# Patient Record
Sex: MALE | Race: WHITE | Employment: UNEMPLOYED | URBAN - METROPOLITAN AREA
[De-identification: names, ages, dates, MRNs, and addresses within clinical notes are randomized per-mention and may not be internally consistent; named-entity substitution may affect disease eponyms.]

---

## 2017-02-01 ENCOUNTER — TELEPHONE (OUTPATIENT)
Dept: INTERNAL MEDICINE CLINIC | Facility: CLINIC | Age: 57
End: 2017-02-01

## 2017-02-02 ENCOUNTER — TELEPHONE (OUTPATIENT)
Dept: ENDOCRINOLOGY CLINIC | Facility: CLINIC | Age: 57
End: 2017-02-02

## 2017-03-10 ENCOUNTER — TELEPHONE (OUTPATIENT)
Dept: INTERNAL MEDICINE CLINIC | Facility: CLINIC | Age: 57
End: 2017-03-10

## 2017-03-10 NOTE — TELEPHONE ENCOUNTER
Spoke with pt. Pt stated does not want to use Dr. vAelina Vivar as PCP anymore, and does not want to be contacted again.

## 2017-03-13 RX ORDER — BLOOD SUGAR DIAGNOSTIC
STRIP MISCELLANEOUS
Qty: 100 STRIP | Refills: 0 | OUTPATIENT
Start: 2017-03-13

## 2018-04-15 ENCOUNTER — HOSPITAL ENCOUNTER (INPATIENT)
Facility: HOSPITAL | Age: 58
LOS: 3 days | Discharge: ANOTHER HOSPITAL | DRG: 287 | End: 2018-04-19
Attending: EMERGENCY MEDICINE | Admitting: HOSPITALIST
Payer: COMMERCIAL

## 2018-04-15 DIAGNOSIS — I10 ESSENTIAL HYPERTENSION: Primary | ICD-10-CM

## 2018-04-15 DIAGNOSIS — R77.8 ELEVATED TROPONIN: ICD-10-CM

## 2018-04-15 DIAGNOSIS — E11.8 TYPE 2 DIABETES MELLITUS WITH COMPLICATION, WITHOUT LONG-TERM CURRENT USE OF INSULIN (HCC): ICD-10-CM

## 2018-04-15 DIAGNOSIS — R13.19 ESOPHAGEAL DYSPHAGIA: ICD-10-CM

## 2018-04-15 DIAGNOSIS — I20.0 UNSTABLE ANGINA (HCC): ICD-10-CM

## 2018-04-16 ENCOUNTER — APPOINTMENT (OUTPATIENT)
Dept: CV DIAGNOSTICS | Facility: HOSPITAL | Age: 58
DRG: 287 | End: 2018-04-16
Attending: HOSPITALIST
Payer: COMMERCIAL

## 2018-04-16 ENCOUNTER — APPOINTMENT (OUTPATIENT)
Dept: INTERVENTIONAL RADIOLOGY/VASCULAR | Facility: HOSPITAL | Age: 58
DRG: 287 | End: 2018-04-16
Attending: INTERNAL MEDICINE
Payer: COMMERCIAL

## 2018-04-16 ENCOUNTER — APPOINTMENT (OUTPATIENT)
Dept: GENERAL RADIOLOGY | Facility: HOSPITAL | Age: 58
DRG: 287 | End: 2018-04-16
Attending: EMERGENCY MEDICINE
Payer: COMMERCIAL

## 2018-04-16 ENCOUNTER — ANESTHESIA EVENT (OUTPATIENT)
Dept: ENDOSCOPY | Facility: HOSPITAL | Age: 58
DRG: 287 | End: 2018-04-16
Payer: COMMERCIAL

## 2018-04-16 PROBLEM — I20.0 UNSTABLE ANGINA (HCC): Status: ACTIVE | Noted: 2018-04-16

## 2018-04-16 PROBLEM — R07.9 CHEST PAIN OF UNCERTAIN ETIOLOGY: Status: ACTIVE | Noted: 2018-04-16

## 2018-04-16 PROBLEM — E11.8 TYPE 2 DIABETES MELLITUS WITH COMPLICATION, WITHOUT LONG-TERM CURRENT USE OF INSULIN (HCC): Status: ACTIVE | Noted: 2018-04-16

## 2018-04-16 PROBLEM — I10 ESSENTIAL HYPERTENSION: Status: ACTIVE | Noted: 2018-04-16

## 2018-04-16 PROBLEM — R77.8 ELEVATED TROPONIN: Status: ACTIVE | Noted: 2018-04-16

## 2018-04-16 PROBLEM — R79.89 ELEVATED TROPONIN: Status: ACTIVE | Noted: 2018-04-16

## 2018-04-16 PROCEDURE — 71045 X-RAY EXAM CHEST 1 VIEW: CPT | Performed by: EMERGENCY MEDICINE

## 2018-04-16 PROCEDURE — 4A023N7 MEASUREMENT OF CARDIAC SAMPLING AND PRESSURE, LEFT HEART, PERCUTANEOUS APPROACH: ICD-10-PCS | Performed by: INTERNAL MEDICINE

## 2018-04-16 PROCEDURE — 93306 TTE W/DOPPLER COMPLETE: CPT | Performed by: HOSPITALIST

## 2018-04-16 PROCEDURE — 99223 1ST HOSP IP/OBS HIGH 75: CPT | Performed by: HOSPITALIST

## 2018-04-16 PROCEDURE — B2151ZZ FLUOROSCOPY OF LEFT HEART USING LOW OSMOLAR CONTRAST: ICD-10-PCS | Performed by: INTERNAL MEDICINE

## 2018-04-16 PROCEDURE — B2111ZZ FLUOROSCOPY OF MULTIPLE CORONARY ARTERIES USING LOW OSMOLAR CONTRAST: ICD-10-PCS | Performed by: INTERNAL MEDICINE

## 2018-04-16 RX ORDER — CLOPIDOGREL BISULFATE 75 MG/1
75 TABLET ORAL DAILY
Status: DISCONTINUED | OUTPATIENT
Start: 2018-04-17 | End: 2018-04-16

## 2018-04-16 RX ORDER — ATORVASTATIN CALCIUM 80 MG/1
80 TABLET, FILM COATED ORAL NIGHTLY
Status: DISCONTINUED | OUTPATIENT
Start: 2018-04-16 | End: 2018-04-16 | Stop reason: HOSPADM

## 2018-04-16 RX ORDER — ONDANSETRON 2 MG/ML
4 INJECTION INTRAMUSCULAR; INTRAVENOUS EVERY 4 HOURS PRN
Status: DISCONTINUED | OUTPATIENT
Start: 2018-04-16 | End: 2018-04-19

## 2018-04-16 RX ORDER — ENOXAPARIN SODIUM 100 MG/ML
1 INJECTION SUBCUTANEOUS ONCE
Status: COMPLETED | OUTPATIENT
Start: 2018-04-16 | End: 2018-04-16

## 2018-04-16 RX ORDER — HEPARIN SODIUM AND DEXTROSE 10000; 5 [USP'U]/100ML; G/100ML
INJECTION INTRAVENOUS CONTINUOUS
Status: ACTIVE | OUTPATIENT
Start: 2018-04-16 | End: 2018-04-17

## 2018-04-16 RX ORDER — LIDOCAINE HYDROCHLORIDE 10 MG/ML
INJECTION, SOLUTION INFILTRATION; PERINEURAL
Status: COMPLETED
Start: 2018-04-16 | End: 2018-04-16

## 2018-04-16 RX ORDER — VERAPAMIL HYDROCHLORIDE 2.5 MG/ML
INJECTION, SOLUTION INTRAVENOUS
Status: COMPLETED
Start: 2018-04-16 | End: 2018-04-16

## 2018-04-16 RX ORDER — SODIUM CHLORIDE 9 MG/ML
INJECTION, SOLUTION INTRAVENOUS CONTINUOUS
Status: DISCONTINUED | OUTPATIENT
Start: 2018-04-16 | End: 2018-04-16

## 2018-04-16 RX ORDER — MORPHINE SULFATE 4 MG/ML
4 INJECTION, SOLUTION INTRAMUSCULAR; INTRAVENOUS EVERY 2 HOUR PRN
Status: DISCONTINUED | OUTPATIENT
Start: 2018-04-16 | End: 2018-04-19

## 2018-04-16 RX ORDER — ASPIRIN 81 MG/1
324 TABLET, CHEWABLE ORAL ONCE
Status: COMPLETED | OUTPATIENT
Start: 2018-04-16 | End: 2018-04-16

## 2018-04-16 RX ORDER — TEMAZEPAM 7.5 MG/1
7.5 CAPSULE ORAL NIGHTLY PRN
Status: DISCONTINUED | OUTPATIENT
Start: 2018-04-16 | End: 2018-04-19

## 2018-04-16 RX ORDER — ASPIRIN 325 MG
325 TABLET ORAL DAILY
Status: DISCONTINUED | OUTPATIENT
Start: 2018-04-16 | End: 2018-04-19

## 2018-04-16 RX ORDER — METOPROLOL TARTRATE 5 MG/5ML
5 INJECTION INTRAVENOUS ONCE
Status: COMPLETED | OUTPATIENT
Start: 2018-04-16 | End: 2018-04-16

## 2018-04-16 RX ORDER — SODIUM CHLORIDE 9 MG/ML
INJECTION, SOLUTION INTRAVENOUS CONTINUOUS
Status: ACTIVE | OUTPATIENT
Start: 2018-04-16 | End: 2018-04-16

## 2018-04-16 RX ORDER — ASPIRIN 81 MG/1
324 TABLET, CHEWABLE ORAL ONCE
Status: DISCONTINUED | OUTPATIENT
Start: 2018-04-16 | End: 2018-04-16 | Stop reason: HOSPADM

## 2018-04-16 RX ORDER — MIDAZOLAM HYDROCHLORIDE 1 MG/ML
INJECTION INTRAMUSCULAR; INTRAVENOUS
Status: COMPLETED
Start: 2018-04-16 | End: 2018-04-16

## 2018-04-16 RX ORDER — DEXTROSE MONOHYDRATE 25 G/50ML
50 INJECTION, SOLUTION INTRAVENOUS
Status: DISCONTINUED | OUTPATIENT
Start: 2018-04-16 | End: 2018-04-19

## 2018-04-16 RX ORDER — METOPROLOL SUCCINATE 25 MG/1
25 TABLET, EXTENDED RELEASE ORAL
Status: DISCONTINUED | OUTPATIENT
Start: 2018-04-16 | End: 2018-04-16 | Stop reason: HOSPADM

## 2018-04-16 RX ORDER — ATORVASTATIN CALCIUM 20 MG/1
20 TABLET, FILM COATED ORAL NIGHTLY
Status: DISCONTINUED | OUTPATIENT
Start: 2018-04-16 | End: 2018-04-19

## 2018-04-16 RX ORDER — NITROGLYCERIN 0.4 MG/1
0.4 TABLET SUBLINGUAL EVERY 5 MIN PRN
Status: DISCONTINUED | OUTPATIENT
Start: 2018-04-16 | End: 2018-04-19

## 2018-04-16 RX ORDER — PANTOPRAZOLE SODIUM 40 MG/1
40 TABLET, DELAYED RELEASE ORAL
Status: DISCONTINUED | OUTPATIENT
Start: 2018-04-17 | End: 2018-04-17

## 2018-04-16 RX ORDER — MORPHINE SULFATE 4 MG/ML
1 INJECTION, SOLUTION INTRAMUSCULAR; INTRAVENOUS EVERY 2 HOUR PRN
Status: DISCONTINUED | OUTPATIENT
Start: 2018-04-16 | End: 2018-04-19

## 2018-04-16 RX ORDER — HEPARIN SODIUM AND DEXTROSE 10000; 5 [USP'U]/100ML; G/100ML
1000 INJECTION INTRAVENOUS ONCE
Status: COMPLETED | OUTPATIENT
Start: 2018-04-16 | End: 2018-04-16

## 2018-04-16 RX ORDER — MORPHINE SULFATE 4 MG/ML
2 INJECTION, SOLUTION INTRAMUSCULAR; INTRAVENOUS EVERY 2 HOUR PRN
Status: DISCONTINUED | OUTPATIENT
Start: 2018-04-16 | End: 2018-04-19

## 2018-04-16 RX ORDER — HEPARIN SODIUM 5000 [USP'U]/ML
INJECTION, SOLUTION INTRAVENOUS; SUBCUTANEOUS
Status: COMPLETED
Start: 2018-04-16 | End: 2018-04-16

## 2018-04-16 NOTE — PLAN OF CARE
NURSING ADMISSION NOTE      Patient admitted via Cart  Oriented to room. Safety precautions initiated. Bed in low position. Call light in reach.     Assumed care of patient at 0230--Admitted from ER wit CO CP with diaphoresis and R arm pain, along wi

## 2018-04-16 NOTE — PROGRESS NOTES
ALMA Beck back at bedside to speak w/patient re: needing groins prepped, consent signed, and severity of diagnosis. Per ALMA Osborn, pt now willing to have groins prepped.

## 2018-04-16 NOTE — SLP NOTE
Order received for bedside swallow evaluation however pt currently undergoing echocardiogram and NPO for pending angiogram per RN. Will re-attempt as available and appropriate.      Beth Pina MA, [id number] [facility]  Pager

## 2018-04-16 NOTE — PAYOR COMM NOTE
--------------  ADMISSION REVIEW     Payor: Asher CARLSON PPO  Subscriber #:  [id number]  Authorization Number: N/A    Admit date: 4/16/18  Admit time: 2000 [facility]       Admitting Physician: Ky Mccoy MD  Attending Physician:  Rodríguez Marquez MD  [facility] coronary disease with calcification. He eventually underwent cardiac catheterization which showed evidence of 3 vessel disease resulting in 2 cardiac stents. Patient's wife present later stating that he had a heart attack last year.   He never followed lymphadenopathy or carotid bruit. Cardiovascular exam: Regular rate and rhythm. There are no murmurs, rubs, gallops. Lungs: Clear to auscultation bilaterally. There is no audible wheezes, Rales, rhonchi. Abdomen: Soft, nontender, nondistended.   There 04/16 0103  Comment: Patient noted to have elevated troponin of 0.139. Patient was already given 4 baby aspirin, IV Lopressor. Patient was administered Lovenox 1 mg/kg subcutaneously. Patient will be admitted to cardiac telemetry floor.   Currently pain- diagnosis)  Unstable angina (HCC)  Type 2 diabetes mellitus with complication, without long-term current use of insulin (HCC)  Elevated troponin    Disposition:  Admit  4/16/2018 12:54 am    Follow-up:  No follow-up provider specified.       Medications Pre \"about 12lbs\" in the last month without trying.      Past Medical History:  Past Medical History:   Diagnosis Date   • Atherosclerosis of coronary artery    • DIABETES    • Diabetes ([facility] [address]    • Essential hypertension    • Type II or unspecified type diabet lesions. Psychiatric: Appropriate mood and affect.       Diagnostic Data:      Labs:  Recent Labs   Lab  04/16/18   0005   WBC  7.5   HGB  14.6   MCV  82.6   PLT  339.0   INR  0.90       Recent Labs   Lab  04/16/18   0005   GLU  319*   BUN  21*   CREATSER Injection Sai Vo MD      Metoprolol Succinate ER (Toprol XL) 24 hr tab 25 mg     Date Action Dose Route User    4/16/2018 0533 Given 25 mg Oral Gregorio Soto RN      metoprolol Tartrate (LOPRESSOR) 5 MG/5ML injection 5 mg     Date Action Dose R the food. There has not been a clear-cut exertional component, although he is fairly sedentary.   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     PAST MEDICAL HISTORY:  Coronary disease status post 2 stents by report 2017, hypertensio bruits. LUNGS:  Clear to percussion and auscultation. No rales, rhonchi, or wheezes. HEART:  Regular rate and rhythm. Normal S1, S2. No appreciable murmur, rub, or gallop. ABDOMEN:  Soft, nontender. Bowel sounds are present.   There is no liver or s [id number]  [facility]/

## 2018-04-16 NOTE — PROGRESS NOTES
Pt had LHC this am- found to have multi vessel CAD - Dr Wynn Areas speaking w/ pt and wife this afternoon  + trop this am      Also need w/u for dysphasia  Wt loss   Has lost 12 lbs in last month- c/o food getting caught     Speech to see, tolerated lunch

## 2018-04-16 NOTE — PROCEDURES
[facility]  Angio via Radial Artery Procedure Note    Escobar Levine Location: Cath Lab    CSN [id number] MRN [id number]   Admission Date 4/15/2018 Procedure Date 4/16/2018   Attending Physician Tanya Ríos MD Procedure Physician Andreina Montes MD significant disease  3. The circumflex artery is a codominant system with no significant disease  4. The left anterior descending artery is severely diseased throughout its course there is a 95% area of in-stent restenosis in the proximal LAD.   Proximal

## 2018-04-16 NOTE — CONSULTS
[facility]    PATIENT'S NAME: Leann Kim   ATTENDING PHYSICIAN: Baldemar Madera MD   CONSULTING PHYSICIAN: Yadira Mckeon M.D.    PATIENT ACCOUNT#:   [de-identified]    LOCATION:  E-A [address] [facility]  MEDICAL RECORD #:   [id number]       DATE OF B Coronary disease status post 2 stents by report 2017, hypertension, diabetes mellitus, dyslipidemia, dysphagia.     PAST SURGICAL HISTORY:  History of carpal tunnel surgery 2011, right shoulder surgery 2010, nasal septoplasty in the past.    MEDICATIONS: Bowel sounds are present. There is no liver or spleen appreciated. No palpable abdominal aorta. EXTREMITIES:  Without edema. Pulses are present bilaterally. LABORATORY DATA:  H and H of 14.6 and 41.4, white blood count 7.5, platelet count 472,100.

## 2018-04-16 NOTE — PROGRESS NOTES
04/16/18 0214 04/16/18 0220 04/16/18 0227   Vital Signs   Pulse 75 77 88   Heart Rate Source Monitor Monitor Monitor   Cardiac Rhythm NSR NSR NSR   Resp 19 18 18   Respiratory Quality Normal Normal Normal   /81 137/87 145/92   BP Location Left arm

## 2018-04-16 NOTE — ANESTHESIA PREPROCEDURE EVALUATION
PRE-OP EVALUATION    Patient Name: Royal Lyn    Pre-op Diagnosis: Esophageal dysphagia [R13.10]    Procedure(s):  ESOPHAGOGASTRODUODENOSCOPY (EGD)    Surgeon(s) and Role:     * Domi Diaz, DO - Primary    Pre-op vitals reviewed.   Temp: 97.8 ° UNIT/ML injection      [COMPLETED] iohexol (OMNIPAQUE) 350 MG/ML injection 100 mL 100 mL Injection ONCE PRN   [] 0.9%  NaCl infusion  Intravenous Continuous   atorvastatin (LIPITOR) tab 20 mg 20 mg Oral Nightly   [COMPLETED] heparin (PORCINE) 27456u there is an 80% stenosis. This is on an angulated segment  2. The left main coronary artery has no significant disease  3. The circumflex artery is a codominant system with no significant disease  4.   The left anterior descending artery is severely dise Lab Results  Component Value Date   INR 0.90 04/16/2018         Airway      Mallampati: III  Mouth opening: 3 FB  TM distance: 4 - 6 cm  Neck ROM: full Cardiovascular    Cardiovascular exam normal.         Dental    No notable dental history.

## 2018-04-16 NOTE — CONSULTS
Cardiology Consult Kenyatta Baires) Dictated #[id number]  April 16, 2018    IMPRESSION:  Unstable angina--  CAD S/P stenting at [facility] 2017--no records  HTN  DM  Dyslipidemia-hx marked hypertriglyceridemia  Medication noncompliance  Dysphagia with recent w

## 2018-04-16 NOTE — ED NOTES
Wife arrived and reports pt is non compliant with cardiac & diabetic meds. Also states he never followed up with cardiologist after heart attack 7/2017.

## 2018-04-16 NOTE — SLP NOTE
ADULT SWALLOWING EVALUATION    ASSESSMENT    ASSESSMENT/OVERALL IMPRESSION:  Order received for bedside swallow evaluation.  Pt is an 64 y/o male admitted with c/o chest pain and \"food sticking in my throat\" reporting a 12 lb weight loss in the past month uncertain etiology    Unstable angina (HCC)    Type 2 diabetes mellitus with complication, without long-term current use of insulin (HCC)    Elevated troponin      Past Medical History  Past Medical History:   Diagnosis Date   • Atherosclerosis of coronary

## 2018-04-16 NOTE — H&P
[facility]PITAL[facility]  History and Physical     Jose Omersulaiman Whittwilli Patient Status:  Inpatient    1960 MRN [id number]   [facility] 2NE-A Attending Vani Kay MD   Hosp Day # 0 PCP No primary care provider on file.      Chief Complai Allergies    Medications:    No current facility-administered medications on file prior to encounter.    Current Outpatient Prescriptions on File Prior to Encounter:  FREESTYLE TEST In Vitro Strip TEST DAILY Disp: 100 strip Rfl: 0   Fenofibrate 160 MG Oral reviewed in Epic. ASSESSMENT / PLAN:     1. NSTEMI/Unstable Angina - Atypical CP. Hx of CAD s/p PCI with medication noncompliance. 1. Cardiology  2. Tele, serial troponin   3. Lovenox, asa, metoprolol, atorva   2. DM  1. Hyperglycemia protocl  2.  A1

## 2018-04-16 NOTE — ED PROVIDER NOTES
Patient Seen in: [facility] Emergency Department    History   Patient presents with:  Chest Pain Angina (cardiovascular)    Stated Complaint: cp    HPI    81-year-old male with a history of type 2 diabetes, history coronary disease, presents to the mention of complication, uncontrolled 9/4/2015 2012       Past Surgical History:  02/11: OTHER SURGICAL HISTORY      Comment: carpal tunnel surgery  L/R  10/10: OTHER SURGICAL HISTORY      Comment: shoulder surgery  R   No date: OTHER SURGICAL HISTORY lesions noted.     ED Course     Labs Reviewed   TROPONIN I - Abnormal; Notable for the following:        Result Value    Troponin 0.139 (*)     All other components within normal limits   PROTHROMBIN TIME (PT) - Normal   PTT, ACTIVATED - Normal    Narrativ treating  [chest pain versus epigastric pain, hypertension]. The patient had good response to these medications. Patient continued to be observed here in the emergency department.   Patient remained stable throughout the emergency department observation

## 2018-04-16 NOTE — CONSULTS
[facility]                       Gastroenterology [address] [facility]    Jenny Campbell Patient Status:  Inpatient    1960 MRN [id number]   [facility] 2NE-A Attending MD Elise Mcpherson mg Sublingual Q5 Min PRN   glucose (DEX4) oral liquid 15 g 15 g Oral Q15 Min PRN   Or      Glucose-Vitamin C (DEX-4) 4-0.006 g chewable tab 4 tablet 4 tablet Oral Q15 Min PRN   Or      dextrose 50 % injection 50 mL 50 mL Intravenous Q15 Min PRN   Or      g other illicit substances. FamHx: The patient has no family history of colon cancer or other gastrointestinal malignancies;  ROS:  In addition to the pertinent positives described above:             Infectious Disease:  No chronic infections or recent fever cyanosis  Skin: Warm and dry  Psychiatric: Appropriate mood and congruent affect without obvious depression or anxiety  Labs:   Lab Results  Component Value Date   WBC 7.4 04/16/2018   HGB 13.7 04/16/2018   HCT 39.7 04/16/2018   .0 04/16/2018   CREA procedures now. I would also recommend colonoscopy given his weight loss and altered bowel habits, but he is declining at this time. He understands that there is a risk of missing colon cancer and polyps. 2. He is on CLD, NPO after MN  3.  PPI daily

## 2018-04-16 NOTE — CM/SW NOTE
04/16/18 1500   Patient Info   Patient's Mental Status Alert;Oriented   Patient's [address]   Patient lives with Spouse   Patient Status Prior to Admission   Independent with ADLs and Mobility Yes   Discharge Needs   Anticipated D/C needs O

## 2018-04-16 NOTE — ED INITIAL ASSESSMENT (HPI)
Pt to er via ems from home  Cp x 30 min = resolved   Was diaphoretic and had right arm pain   On arrival denied pain/sob

## 2018-04-16 NOTE — PROGRESS NOTES
1042: Rec'd report from [address] [place] [address]. 1055: Pt transferred to unit via bed; only IVF running. Right radial site verified with CCL transporting RNs. Telemetry monitor applied. VSS.   Right radial is clean, dry, intact (sheath already pulled in CCL an

## 2018-04-16 NOTE — PROGRESS NOTES
Still refusing to sign consent after APN spoke w/patient this AM regarding procedure & answered all questions. Refusing to have groins shaved/prepped stating \"I will leave and go to [facility] if they want to go through my groin.   I will only let t

## 2018-04-16 NOTE — CONSULTS
[facility]  Diabetes Clinical Nurse Specialist Consult Note    David Courser Shannan Patient Status:  Inpatient    1960 MRN [id number]   [facility] 2NE-A Attending Laila Méndez MD   Hosp Day # 0 PCP No primary care provider on file. steps.     Pt was still sleepy from EGD anesthesia, spoke at length to his wife. Pt is a  with local routes, he does not check his blood sugar regularly. Still no answer as to why patient stopped taking all his medications.  I explained I did no one)  · [address] (Kelin Mathis NP)    Valentino Able, MSN, APN, ACNS-BC, BC-ADM  Clinical Nurse Specialist  Diabetes Educator  4/16/2018  9:59 AM

## 2018-04-16 NOTE — PLAN OF CARE
Comments: Pt is A&OX4, VSS on RA, and maintaining NSR w/1st degree AVB on telemetry. Was very difficult this AM prior to procedure (refer to previously written notes by this writer).   Now, back from angiogram and verbalized to RN that \"I wasn't told anyt wrist\"  Interventions:   - See additional Care Plan goals for specific interventions    Outcome: Progressing    Problem: CARDIOVASCULAR - ADULT  Goal: Maintains optimal cardiac output and hemodynamic stability  INTERVENTIONS:  - Monitor vital signs, rhyth applesauce as needed  - Discontinue feeding and notify MD (or speech pathologist) if coughing or persistent throat clearing or wet/gurgly vocal quality is noted   Outcome: Progressing    Problem: PAIN - ADULT  Goal: Verbalizes/displays adequate comfort lev concerns in a socially appropriate manner  - Utilize positive, consistent limit setting strategies supporting safety of patient, staff and others  - Encourage participation in the decision making process about the behavioral management agreement  - Impleme

## 2018-04-17 ENCOUNTER — SURGERY (OUTPATIENT)
Age: 58
End: 2018-04-17

## 2018-04-17 ENCOUNTER — ANESTHESIA (OUTPATIENT)
Dept: ENDOSCOPY | Facility: HOSPITAL | Age: 58
DRG: 287 | End: 2018-04-17
Payer: COMMERCIAL

## 2018-04-17 PROCEDURE — 0DB38ZX EXCISION OF LOWER ESOPHAGUS, VIA NATURAL OR ARTIFICIAL OPENING ENDOSCOPIC, DIAGNOSTIC: ICD-10-PCS | Performed by: INTERNAL MEDICINE

## 2018-04-17 PROCEDURE — 0DB68ZX EXCISION OF STOMACH, VIA NATURAL OR ARTIFICIAL OPENING ENDOSCOPIC, DIAGNOSTIC: ICD-10-PCS | Performed by: INTERNAL MEDICINE

## 2018-04-17 PROCEDURE — 99232 SBSQ HOSP IP/OBS MODERATE 35: CPT | Performed by: HOSPITALIST

## 2018-04-17 PROCEDURE — 0DB18ZX EXCISION OF UPPER ESOPHAGUS, VIA NATURAL OR ARTIFICIAL OPENING ENDOSCOPIC, DIAGNOSTIC: ICD-10-PCS | Performed by: INTERNAL MEDICINE

## 2018-04-17 PROCEDURE — 0DB98ZX EXCISION OF DUODENUM, VIA NATURAL OR ARTIFICIAL OPENING ENDOSCOPIC, DIAGNOSTIC: ICD-10-PCS | Performed by: INTERNAL MEDICINE

## 2018-04-17 RX ORDER — HEPARIN SODIUM AND DEXTROSE 10000; 5 [USP'U]/100ML; G/100ML
INJECTION INTRAVENOUS CONTINUOUS
Status: DISCONTINUED | OUTPATIENT
Start: 2018-04-17 | End: 2018-04-17

## 2018-04-17 RX ORDER — PANTOPRAZOLE SODIUM 40 MG/1
40 TABLET, DELAYED RELEASE ORAL
Qty: 120 TABLET | Refills: 0 | Status: SHIPPED | OUTPATIENT
Start: 2018-04-17 | End: 2018-06-16

## 2018-04-17 RX ORDER — HEPARIN SODIUM AND DEXTROSE 10000; 5 [USP'U]/100ML; G/100ML
INJECTION INTRAVENOUS CONTINUOUS
Status: DISCONTINUED | OUTPATIENT
Start: 2018-04-17 | End: 2018-04-19

## 2018-04-17 RX ORDER — HEPARIN SODIUM 5000 [USP'U]/ML
30 INJECTION INTRAVENOUS; SUBCUTANEOUS ONCE
Status: COMPLETED | OUTPATIENT
Start: 2018-04-17 | End: 2018-04-17

## 2018-04-17 RX ORDER — PANTOPRAZOLE SODIUM 40 MG/1
40 TABLET, DELAYED RELEASE ORAL
Status: DISCONTINUED | OUTPATIENT
Start: 2018-04-17 | End: 2018-04-19

## 2018-04-17 RX ORDER — BLOOD SUGAR DIAGNOSTIC
STRIP MISCELLANEOUS
Qty: 100 STRIP | Refills: 1 | Status: SHIPPED | OUTPATIENT
Start: 2018-04-17

## 2018-04-17 RX ORDER — LANCETS
EACH MISCELLANEOUS
Qty: 102 EACH | Refills: 1 | Status: SHIPPED | OUTPATIENT
Start: 2018-04-17

## 2018-04-17 NOTE — PROGRESS NOTES
[facility]ITAL[facility]  Progress Note     Henri Campbell Patient Status:  Inpatient    1960 MRN [id number]   [facility] 2NE-A Attending Vero Ogden MD   Hosp Day # 1 PCP No primary care provider on file.      Chief Complaint:  Chest pa Recent Labs   Lab  04/16/18   0005  04/16/18   0532  04/16/18   0901   TROP  0.139*  1.630*  2.280*            Imaging: Imaging data reviewed in Epic.     Medications:   • pantoprazole (PROTONIX) IV push  40 mg Intravenous BID AC    Or   • Pantoprazol

## 2018-04-17 NOTE — PROGRESS NOTES
[facility]  Progress Note    Olive Campbell Patient Status:  Inpatient    1960 MRN [id number]   [facility] 2NE-A Attending Alem Anthony MD   Hosp Day # 1 PCP No primary care provider on file.      Subjective:  Patient sleepy af yordan Howe  4/17/2018  Patient seen and examined, long conversation with family concerning CABG vs PCI, I am leaning towards CABG, family is awaiitng second opinion from Dr Carlson Postin at [facility]

## 2018-04-17 NOTE — PAYOR COMM NOTE
--------------  CONTINUED STAY REVIEW    Payor: Carmel CARLSON PPO  Subscriber #:  [id number]  Authorization Number: [id number]    Admit date: 4/16/18  Admit [facility]: [facility]    Admitting Physician: Bing Mora MD  Attending Physician:  Bing Mora MD  [facility] Cardiology and possibly cardiac surgery to consult with patient today for next steps.      Pt was still sleepy from EGD anesthesia, spoke at length to his wife. Pt is a  with local routes, he does not check his blood sugar regularly.  Still no a insurance     PROVIDER F/U RECOMMENDATIONS:     · Patient's current PCP (does not currently have one)  · [address] (Padilla Shannon NP)     Nataly Gomes, MSN, APN, ACNS-BC, BC-ADM  Clinical Nurse Specialist  Diabetes Educator  4/1

## 2018-04-17 NOTE — PLAN OF CARE
BEHAVIOR    • Pt/Family maintain appropriate behavior and adhere to behavioral management agreement, if implemented Progressing        CARDIOVASCULAR - ADULT    • Maintains optimal cardiac output and hemodynamic stability Progressing    • Absence of cardia increasingly upset, wanting all doctors to be called now to see when they are coming in tomorrow, explained that right now only the on call docs will be answering and they will not know physician's specific schedule for tomorrow, explained RN can start raul

## 2018-04-17 NOTE — PLAN OF CARE
BEHAVIOR    • Pt/Family maintain appropriate behavior and adhere to behavioral management agreement, if implemented Progressing        CARDIOVASCULAR - ADULT    • Maintains optimal cardiac output and hemodynamic stability Progressing    • Absence of cardia

## 2018-04-17 NOTE — OPERATIVE REPORT
Operative Report-Esophagogastroduodenoscopy with cold biopsies  Meena Hackett 11/20/1960   [facility] [id number] MRN [id number]   Admission Date 4/15/2018 Operation Date 4/17/2018   Attending Physician Rach Ward MD Operating Physician Leslee Park DO - We will have patient follow up in clinic. Ok to continue blood thinners as patient needs this for his heart disease.      CC Report:     Isabelle Mooney  4/17/2018  8:33 AM

## 2018-04-17 NOTE — PROGRESS NOTES
[facility]  Diabetes Clinical Nurse Specialist Progress Note    Stan Campbell Patient Status:  Inpatient    1960 MRN [id number]   [facility] 2NE-A Attending Hanny Turner MD   Hosp Day # 1 PCP No primary care provider on file

## 2018-04-17 NOTE — ANESTHESIA POSTPROCEDURE EVALUATION
[facility] Patient Status:  Inpatient   Age/Gender 65year old male MRN [id number]   Location [facility] Attending Reddy Mosqueda MD   Hosp Day # 1 PCP No primary care provider on file.        Anesthesia Post-op Note

## 2018-04-18 PROCEDURE — 99233 SBSQ HOSP IP/OBS HIGH 50: CPT | Performed by: HOSPITALIST

## 2018-04-18 RX ORDER — GABAPENTIN 100 MG/1
100 CAPSULE ORAL 3 TIMES DAILY
Status: DISCONTINUED | OUTPATIENT
Start: 2018-04-18 | End: 2018-04-19

## 2018-04-18 NOTE — PROGRESS NOTES
[facility]PITAL[facility]  Progress Note     Jennifer Hackettwilliam Patient Status:  Inpatient    1960 MRN [id number]   [facility] 2NE-A Attending Reddy Mosqueda MD   Hosp Day # 2 PCP No primary care provider on file.      Chief Complaint:  Chest pa Estimated Creatinine Clearance: 87.6 mL/min (based on SCr of 0.88 mg/dL).     Recent Labs   Lab  04/16/18   0005   PTP  12.5   INR  0.90       Recent Labs   Lab  04/16/18   0005  04/16/18   0532  04/16/18   0901   TROP  0.139*  1.630*  2.280* edema    Cont cardiac meds, heparin gtt, CV surgery eval pending.     Adriane Em  4/18/2018

## 2018-04-18 NOTE — DIETARY NOTE
Nutrition Short Note    Pt screened for HgbA1C >8%. Appropriate education and handout with contact info provided. Reviewed all carb containing foods, serving sizes, meal balance, consistent intake, and snacks.   Practiced carb counting using every day examp

## 2018-04-18 NOTE — PAYOR COMM NOTE
--------------  CONTINUED STAY REVIEW    Payor: Josekwan Pereyra ROBYN [facility]  Subscriber #:  [id number]  Authorization Number: [id number]    Admit date: 4/16/18  Admit time: 5    Admitting Physician: Eli Nava MD  Attending Physician:  MD JUANA Morales Duodenal bulb ulcerations  PROCEDURE PERFORMED: EGD  INFORMED CONSENT: Once a brief history and physical examination was performed, the risks, benefits and alternatives to the procedure were discussed with the patient and/or family and informed consent was Temp 97.9 °F (36.6 °C) (Oral)   Resp 13   Ht 5' 7\" (1.702 m)   Wt 206 lb 11.2 oz (93.8 kg)   SpO2 93%   BMI 32.37 kg/m²      Temp (24hrs), Av.8 °F (36.6 °C), Min:97.6 °F (36.4 °C), Max:98 °F (36.7 °C)        Physical Exam:  Tele: NSR  General: Alert a

## 2018-04-18 NOTE — PLAN OF CARE
Patient/Family Goals    • Patient/Family Short Term Goal Adequate for Discharge          BEHAVIOR    • Pt/Family maintain appropriate behavior and adhere to behavioral management agreement, if implemented Progressing        CARDIOVASCULAR - ADULT    • Main

## 2018-04-18 NOTE — PROGRESS NOTES
[facility]  Cardiology Progress Note    Subjective:  No chest pain or shortness of breath. Resting comfortably in bed.       Objective:  /82 (BP Location: Left arm)   Pulse 90   Temp 97.7 °F (36.5 °C) (Oral)   Resp 20   Ht 170.2 cm (5' 7\")   Wt

## 2018-04-19 VITALS
TEMPERATURE: 98 F | RESPIRATION RATE: 18 BRPM | WEIGHT: 206.69 LBS | DIASTOLIC BLOOD PRESSURE: 93 MMHG | OXYGEN SATURATION: 95 % | HEIGHT: 67 IN | SYSTOLIC BLOOD PRESSURE: 142 MMHG | BODY MASS INDEX: 32.44 KG/M2 | HEART RATE: 85 BPM

## 2018-04-19 PROCEDURE — 99239 HOSP IP/OBS DSCHRG MGMT >30: CPT | Performed by: HOSPITALIST

## 2018-04-19 RX ORDER — GABAPENTIN 100 MG/1
100 CAPSULE ORAL 3 TIMES DAILY
Refills: 0 | Status: SHIPPED | COMMUNITY
Start: 2018-04-19 | End: 2020-12-11

## 2018-04-19 RX ORDER — ASPIRIN 325 MG
325 TABLET ORAL DAILY
Refills: 0 | Status: SHIPPED | COMMUNITY
Start: 2018-04-20 | End: 2020-12-11 | Stop reason: DRUGHIGH

## 2018-04-19 RX ORDER — ATORVASTATIN CALCIUM 20 MG/1
20 TABLET, FILM COATED ORAL NIGHTLY
Refills: 0 | Status: SHIPPED | COMMUNITY
Start: 2018-04-19 | End: 2020-12-11

## 2018-04-19 NOTE — PROGRESS NOTES
[facility]  Progress Note     [facility] Patient Status:  Inpatient    1960 MRN [id number]   [facility] 2NE-A Attending  Marie Mirza MD   Hosp Day # 3 PCP No primary care provider on file.      Chief Complaint:  Chest Pantoprazole Sodium  40 mg Oral BID AC   • aspirin  325 mg Oral Daily   • Insulin Aspart Pen  2-10 Units Subcutaneous TID CC and HS   • atorvastatin  20 mg Oral Nightly       ASSESSMENT / PLAN:     1. Unstable angina- multi vessel CAD -  1.  Cards following

## 2018-04-19 NOTE — CM/SW NOTE
04/19/18 1000   Discharge disposition   Expected discharge disposition Acute Care/Short Term H   Name of Guillermo Ramirez  Norma       received call from [facility], can accept patient today after 11:30am. [facility]

## 2018-04-19 NOTE — PROGRESS NOTES
Pt. Received discharge instructions and follow-up information. Questions addressed and answered. Plan for transfer to [facility] today at [address] [place] [address] via [facility]. RH IV to stay in with Heparin gtt infusing and telemetry worn during transfer.  Report given to

## 2018-04-19 NOTE — PAYOR COMM NOTE
--------------  DISCHARGE REVIEW    Payor: [employer] P[employer]  Subscriber #:  [id number]  Authorization Number: [id number]    Admit date: 4/16/18  Admit time:  0203  Discharge Date: 4/19/2018 11:35 AM     Admitting Physician: Love Mane MD  Attending Imaging data reviewed in Epic.     Medications:   • gabapentin  100 mg Oral TID   • pantoprazole (PROTONIX) IV push  40 mg Intravenous BID AC     Or   • Pantoprazole Sodium  40 mg Oral BID AC   • aspirin  325 mg Oral Daily   • Insulin Aspart Pen  2-10 Unit Progress Note     Subjective:  No chest pain or shortness of breath.   Resting comfortably in bed.       Objective:  /82 (BP Location: Left arm)   Pulse 90   Temp 97.7 °F (36.5 °C) (Oral)   Resp 20   Ht 170.2 cm (5' 7\")   Wt 206 lb 11.2 oz (93.8 kg)

## 2018-04-19 NOTE — PLAN OF CARE
Patient/Family Goals    • Patient/Family Short Term Goal Completed          BEHAVIOR    • Pt/Family maintain appropriate behavior and adhere to behavioral management agreement, if implemented Progressing        CARDIOVASCULAR - ADULT    • Maintains optimal

## 2018-04-19 NOTE — DISCHARGE SUMMARY
[facility]  DISCHARGE SUMMARY     [facility] Patient Status:  Inpatient    1960 MRN [id number]   [facility] 2NE-A Attending No att. providers found   Hosp Day # 3 PCP No primary care provider on file.      Date of Admiss taking any medication since that time, or following up with doctors. Cannot site a specific reason or concern why, except that he didn't want to miss work.      Also has several chronic symptoms on ROS.  +peripheral neuropathy, +knee and ankle pain, Gi upse needs to be on this for 8 weeks and then daily biopsies were negative for H. pylori infection or malignancy. Needs to follow-up in approximately 1 month with GI.     Patient has had noncompliance the benefits of following up to been stressed to him by all superficial small intestinal mucosa without diagnostic abnormality.  -No morphologic evidence of celiac disease.  -No evidence of architectural distortion, dysplasia or malignancy.     B.   Gastric antrum and body biopsies:  -Strips of antral and fundic muc these medications      Instructions Prescription details   ACCU-CHEK FASTCLIX LANCETS Misc      Each drum contains 6 lancets. Use one lancet for each blood glucose test, per physician instructions.    Quantity:  102 each  Refills:  1     aspirin 325 MG Tabs NP  [address] [facility] [place]  [phone]    Go to  5/8/2018 11AM Fidencio Roman [address] [place] [address], [address] [place] [address] [phone]    Go on 5/2/2018  for a follow-up with the GI Nurse practitioner at 1 p

## 2018-05-01 NOTE — PAYOR COMM NOTE
--------------  DISCHARGE REVIEW    [facility]  Subscriber #:  [id number]  Authorization Number: [id number]    Admit date: 4/16/18  Admit time:  0203  Discharge Date: 4/19/2018 11:35 AM    Please let us know if you need additional clinic Medium Risk  0-28   Low Risk. Risk of readmission: Koko Schultz has High Risk of readmission after discharge from the hospital.    Discharge Diagnosis:   1. Unstable angina multivessel CAD  2. Known CAD with status post stenting  3. Dysphasia  4.  U left ventricular EF 40–50% with a grade 1 diastolic dysfunction and positive wall motion abnormality. Patient is remained on heparin drip has had consult with CV surgery and films were also shared with Dr. Rupali Goodson at [facility].   Patient is decided to tr in the proximal LAD. Proximal to the stented proximal portion of the LAD there is also a new 90% stenosis there is a long area of 80% stenosis in the mid LAD and then a 95% stenosis in the apical LAD     Impression:  1.   Normal left ventricular function ventricular     relaxation - grade 1 diastolic dysfunction. 2. Regional wall motion abnormality: Hypokinesis of the basal-mid     anteroseptal and apical myocardium. 3. Left atrium: The left atrium was mildly enlarged. 4. Right ventricle:  The cavity siz changed:  See the new instructions. Use one test strip with each glucose test, per physician directions.    Quantity:  100 strip  Refills:  1        CONTINUE taking these medications      Instructions Prescription details   Fenofibrate 160 MG Tabs edema.   -----------------------------------------------------------------------------------------  PATIENT DISCHARGE INSTRUCTIONS: See electronic chart    Nelida Louise NP, APN  4/19/2018    Time spent:  > 30 minutes      Electronically signed by Blanca Hutton

## 2018-05-08 ENCOUNTER — TELEPHONE (OUTPATIENT)
Dept: INTERNAL MEDICINE CLINIC | Facility: CLINIC | Age: 58
End: 2018-05-08

## 2018-10-30 NOTE — LETTER
07/19/21        Una Beatty [facility] 108 [id number]      Dear Bronwyn [facility],    [facility] records indicate that you have outstanding lab work and or testing that was ordered for you and has not yet been completed:  Orders Placed This Encoun Behavioral health at patient's bedside

## 2020-02-28 ENCOUNTER — ANCILLARY ORDERS (OUTPATIENT)
Dept: INTERNAL MEDICINE CLINIC | Facility: CLINIC | Age: 60
End: 2020-02-28

## 2020-02-28 DIAGNOSIS — Z13.9 SCREENING PROCEDURE: ICD-10-CM

## 2020-04-24 ENCOUNTER — TELEPHONE (OUTPATIENT)
Dept: INTERNAL MEDICINE CLINIC | Facility: CLINIC | Age: 60
End: 2020-04-24

## 2020-04-24 NOTE — TELEPHONE ENCOUNTER
LOV with us 9/4/2015  Last fill: 1/31/2020 (#180) by provider: Chastity Ogden  No labs in our system since 4/2018    Please advise.

## 2020-04-24 NOTE — TELEPHONE ENCOUNTER
Wife called and stated that her  is unable to fill his prescription for Metformin 500mg from his cardiologist because the office is closed. Wife wants to know if Eitan Plummer can call in a prescription for him to [facility] mail order pharmacy.  Please

## 2020-10-21 ENCOUNTER — TELEPHONE (OUTPATIENT)
Dept: INTERNAL MEDICINE CLINIC | Facility: CLINIC | Age: 60
End: 2020-10-21

## 2020-10-21 NOTE — TELEPHONE ENCOUNTER
Pt spouse would like to ask pcp if he can continue to be a pt.  Pt was last seen in 2017 if so pt was exposed to a family member covid positive and would like to get tested

## 2020-10-21 NOTE — TELEPHONE ENCOUNTER
TO patient would like to re-establish as patient, are you okay with this or can he establish with someone else in office? Please advise.

## 2020-10-22 ENCOUNTER — HOSPITAL ENCOUNTER (EMERGENCY)
Age: 60
Discharge: HOME | End: 2020-10-22
Payer: COMMERCIAL

## 2020-10-22 VITALS
RESPIRATION RATE: 16 BRPM | DIASTOLIC BLOOD PRESSURE: 93 MMHG | SYSTOLIC BLOOD PRESSURE: 156 MMHG | BODY MASS INDEX: 32 KG/M2 | WEIGHT: 204 LBS | OXYGEN SATURATION: 97 % | HEART RATE: 96 BPM | TEMPERATURE: 96 F

## 2020-10-22 DIAGNOSIS — Z20.822 CLOSE EXPOSURE TO COVID-19 VIRUS: ICD-10-CM

## 2020-10-22 DIAGNOSIS — B34.9 VIRAL SYNDROME: Primary | ICD-10-CM

## 2020-10-22 PROCEDURE — 99283 EMERGENCY DEPT VISIT LOW MDM: CPT | Performed by: NURSE PRACTITIONER

## 2020-10-22 NOTE — ED PROVIDER NOTES
Patient Seen in: THE [facility] [place]      History   Patient presents with:  Covid    Stated Complaint: flu like s/s , requests covid testing    HPI  52-year-old male with history of diabetes and hypertension presents with a runny no °C) (Temporal)   Resp 16   Wt 92.5 kg   SpO2 97%   BMI 31.95 kg/m²         Physical Exam  Vitals signs and nursing note reviewed. Constitutional:       General: He is not in acute distress. Appearance: He is well-developed.  He is not ill-appearing or

## 2020-10-24 NOTE — ED NOTES
Pt was notified of +covid test results. Pt voicing frustration that he asked for a flu test and they did not do that, but I did explain to the patient that a +covid test would help explain his s/s.  Pt reports back pain, rn did encourage the patient to take

## 2020-12-11 ENCOUNTER — OFFICE VISIT (OUTPATIENT)
Dept: INTERNAL MEDICINE CLINIC | Facility: CLINIC | Age: 60
End: 2020-12-11
Payer: COMMERCIAL

## 2020-12-11 ENCOUNTER — LAB ENCOUNTER (OUTPATIENT)
Dept: LAB | Age: 60
End: 2020-12-11
Attending: NURSE PRACTITIONER
Payer: COMMERCIAL

## 2020-12-11 VITALS
TEMPERATURE: 99 F | RESPIRATION RATE: 16 BRPM | HEART RATE: 84 BPM | WEIGHT: 203.5 LBS | DIASTOLIC BLOOD PRESSURE: 74 MMHG | HEIGHT: 67 IN | BODY MASS INDEX: 31.94 KG/M2 | SYSTOLIC BLOOD PRESSURE: 126 MMHG

## 2020-12-11 DIAGNOSIS — E11.8 TYPE 2 DIABETES MELLITUS WITH COMPLICATION, WITHOUT LONG-TERM CURRENT USE OF INSULIN (HCC): ICD-10-CM

## 2020-12-11 DIAGNOSIS — Z00.00 LABORATORY EXAMINATION ORDERED AS PART OF A ROUTINE GENERAL MEDICAL EXAMINATION: ICD-10-CM

## 2020-12-11 DIAGNOSIS — I25.2 HISTORY OF MI (MYOCARDIAL INFARCTION): ICD-10-CM

## 2020-12-11 DIAGNOSIS — Z12.5 PROSTATE CANCER SCREENING: ICD-10-CM

## 2020-12-11 DIAGNOSIS — Z28.21 INFLUENZA VACCINE REFUSED: ICD-10-CM

## 2020-12-11 DIAGNOSIS — Z00.00 ROUTINE GENERAL MEDICAL EXAMINATION AT A HEALTH CARE FACILITY: Primary | ICD-10-CM

## 2020-12-11 DIAGNOSIS — Z12.11 COLON CANCER SCREENING: ICD-10-CM

## 2020-12-11 PROBLEM — R07.9 CHEST PAIN OF UNCERTAIN ETIOLOGY: Status: RESOLVED | Noted: 2018-04-16 | Resolved: 2020-12-11

## 2020-12-11 PROBLEM — I25.10 CORONARY ARTERY DISEASE INVOLVING NATIVE CORONARY ARTERY OF NATIVE HEART WITHOUT ANGINA PECTORIS: Status: ACTIVE | Noted: 2020-12-11

## 2020-12-11 PROCEDURE — 3008F BODY MASS INDEX DOCD: CPT | Performed by: NURSE PRACTITIONER

## 2020-12-11 PROCEDURE — 80053 COMPREHEN METABOLIC PANEL: CPT

## 2020-12-11 PROCEDURE — 83721 ASSAY OF BLOOD LIPOPROTEIN: CPT

## 2020-12-11 PROCEDURE — 80061 LIPID PANEL: CPT

## 2020-12-11 PROCEDURE — 85025 COMPLETE CBC W/AUTO DIFF WBC: CPT

## 2020-12-11 PROCEDURE — 82306 VITAMIN D 25 HYDROXY: CPT

## 2020-12-11 PROCEDURE — 99214 OFFICE O/P EST MOD 30 MIN: CPT | Performed by: NURSE PRACTITIONER

## 2020-12-11 PROCEDURE — 84443 ASSAY THYROID STIM HORMONE: CPT

## 2020-12-11 PROCEDURE — 36415 COLL VENOUS BLD VENIPUNCTURE: CPT

## 2020-12-11 PROCEDURE — 99396 PREV VISIT EST AGE 40-64: CPT | Performed by: NURSE PRACTITIONER

## 2020-12-11 PROCEDURE — 3078F DIAST BP <80 MM HG: CPT | Performed by: NURSE PRACTITIONER

## 2020-12-11 PROCEDURE — 83036 HEMOGLOBIN GLYCOSYLATED A1C: CPT

## 2020-12-11 PROCEDURE — 3074F SYST BP LT 130 MM HG: CPT | Performed by: NURSE PRACTITIONER

## 2020-12-11 RX ORDER — ASPIRIN 81 MG/1
81 TABLET, CHEWABLE ORAL DAILY
COMMUNITY
Start: 2019-04-18

## 2020-12-11 RX ORDER — ATORVASTATIN CALCIUM 20 MG/1
20 TABLET, FILM COATED ORAL NIGHTLY
Qty: 90 TABLET | Refills: 0 | Status: SHIPPED | OUTPATIENT
Start: 2020-12-11

## 2020-12-11 RX ORDER — METOPROLOL SUCCINATE 25 MG/1
25 TABLET, EXTENDED RELEASE ORAL DAILY
Qty: 90 TABLET | Refills: 0 | Status: SHIPPED | OUTPATIENT
Start: 2020-12-11

## 2020-12-11 RX ORDER — GABAPENTIN 100 MG/1
100 CAPSULE ORAL 3 TIMES DAILY
COMMUNITY
Start: 2019-04-18

## 2020-12-11 RX ORDER — ATORVASTATIN CALCIUM 40 MG/1
20 TABLET, FILM COATED ORAL DAILY
COMMUNITY
Start: 2019-04-18

## 2020-12-11 NOTE — PATIENT INSTRUCTIONS
Diabetes: Overview  Diabetes is a long-term health problem. It means your body doesn't make enough insulin. Or it may mean that your body can't use the insulin it makes. Insulin is a hormone in your body.  It lets blood sugar (glucose) reach the cells in · Try to reach your ideal weight. You may be able to cut back on or not have to take diabetes medicine if you eat the right foods and get exercise. · Don't smoke. Smoking worsens the effects of diabetes on your circulation.  You are much more likely to hav · Check your ketones often. Watch them more often if you are vomiting and having diarrhea. · Don't skip meals. Try to eat small meals on a regular schedule. Do this even if you don't feel like eating.   · Drink water or other liquids that don't have caffei · Thirst  · Headache  · Nausea or vomiting  · Belly (abdominal) pain  · Eyesight changes  · Fast breathing  Also call your provider right away if you have any of these signs of low blood sugar and they don't go away with the above treatment suggestions: · Kidney disease, leading to kidney failure. You may need dialysis or a kidney transplant.   · Nerve problems, causing pain or loss of feeling in your feet and other parts of your body. This may lead to loss of a limb.   · High blood pressure.  This health Many people with heart disease take the 4 medicines shown in this chart. Other common medicines are listed later. Your doctor or cardiac rehab team can help you look at the types of medicines that have been prescribed for you.   Type of medicine What it gonzalez

## 2020-12-11 NOTE — PROGRESS NOTES
Genna Anderson is a 62year old male who presents for a complete physical exam.   HPI:   Pt complains of none, has not been seen by a provider in several years. Is required to get a Cpx by insurance.  Pt has stopped all his medications other than metform Component Value Date    ALT 39 04/16/2018    ALT 51 09/04/2015    ALT 53 03/15/2012     No results found for: PSA     Current Outpatient Medications   Medication Sig Dispense Refill   • metFORMIN HCl 500 MG Oral Tab Take 1 tablet by mouth 2 (two) times a well otherwise  SKIN: denies any unusual skin lesions  EYES:denies blurred vision or double vision, + left eye floaters  HEENT: denies nasal congestion, sinus pain or ST  LUNGS: denies shortness of breath with exertion  CARDIOVASCULAR: denies chest pain on PANEL (14); Future  - HEMOGLOBIN A1C; Future  - LIPID PANEL; Future  - PSA SCREEN; Future  - ASSAY, THYROID STIM HORMONE; Future  - VITAMIN D, 25-HYDROXY; Future    3. Influenza vaccine refused    4.  History of MI (myocardial infarction)  Educated pt on ho

## 2020-12-15 ENCOUNTER — TELEPHONE (OUTPATIENT)
Dept: INTERNAL MEDICINE CLINIC | Facility: CLINIC | Age: 60
End: 2020-12-15

## 2020-12-17 ENCOUNTER — TELEPHONE (OUTPATIENT)
Dept: INTERNAL MEDICINE CLINIC | Facility: CLINIC | Age: 60
End: 2020-12-17

## 2020-12-17 DIAGNOSIS — E55.9 VITAMIN D DEFICIENCY: Primary | ICD-10-CM

## 2020-12-17 DIAGNOSIS — E11.8 TYPE 2 DIABETES MELLITUS WITH COMPLICATION, WITHOUT LONG-TERM CURRENT USE OF INSULIN (HCC): ICD-10-CM

## 2020-12-17 DIAGNOSIS — E78.2 ELEVATED CHOLESTEROL WITH ELEVATED TRIGLYCERIDES: ICD-10-CM

## 2020-12-17 RX ORDER — EMPAGLIFLOZIN 10 MG/1
1 TABLET, FILM COATED ORAL DAILY
Qty: 90 TABLET | Refills: 0 | Status: SHIPPED | OUTPATIENT
Start: 2020-12-17 | End: 2021-03-17

## 2020-12-17 RX ORDER — ERGOCALCIFEROL 1.25 MG/1
50000 CAPSULE ORAL WEEKLY
Qty: 12 CAPSULE | Refills: 1 | Status: SHIPPED | OUTPATIENT
Start: 2020-12-17 | End: 2021-03-17

## 2020-12-17 RX ORDER — FENOFIBRATE 145 MG/1
145 TABLET, COATED ORAL DAILY
Qty: 90 TABLET | Refills: 0 | Status: SHIPPED | OUTPATIENT
Start: 2020-12-17

## 2020-12-17 NOTE — PROGRESS NOTES
LM on VM for patient notifying continue taking Metformin along with Jardiance. Patient to call back with any further questions.

## 2020-12-17 NOTE — PROGRESS NOTES
Spoke with patient regarding test results, patient asking if should continue to take Metformin and Jardiance. Also, please review all orders and prescriptions, and alert. Thank you.

## 2020-12-19 NOTE — TELEPHONE ENCOUNTER
Wellness form completed by Dr. Dona Humphries. Faxed form to #[phone]. Patient's spouse (Chio) aware. Will  form at . Chio states Peggy Lopez should have filled out Vladimir's form and Dr. Dona Humphries should have filled out her form.

## 2020-12-22 NOTE — TELEPHONE ENCOUNTER
Metformin 500 mg Per pt is taking 100 mg BID (2 tablets in the morning and 2 at night) day of OV. LOV 12-11-20  Labs 12-11-20: HgBA1c    Please advise, Medication pended.

## 2020-12-29 NOTE — TELEPHONE ENCOUNTER
Pt spouse called to find out when pt's CPX form got faxed out and also requested to have his copy mailed to her, form in out going mail.

## 2021-03-20 DIAGNOSIS — Z23 NEED FOR VACCINATION: ICD-10-CM

## 2021-06-18 ENCOUNTER — TELEPHONE (OUTPATIENT)
Dept: INTERNAL MEDICINE CLINIC | Facility: CLINIC | Age: 61
End: 2021-06-18

## 2022-02-11 ENCOUNTER — TELEPHONE (OUTPATIENT)
Dept: INTERNAL MEDICINE CLINIC | Facility: CLINIC | Age: 62
End: 2022-02-11

## 2022-02-11 NOTE — TELEPHONE ENCOUNTER
Appointment changed to Dr. Umang Miller   Date Time Provider hospitals   2/17/2022  3:30 PM Gerard Huffman MD [facility] [place]

## 2022-02-11 NOTE — TELEPHONE ENCOUNTER
Patient was previous patient of TO, saw DUNG in 2020 for physical, he is scheduled with RP, but would like to be seen by TO. Please advise if okay to establish as new patient.

## 2022-02-17 ENCOUNTER — OFFICE VISIT (OUTPATIENT)
Dept: INTERNAL MEDICINE CLINIC | Facility: CLINIC | Age: 62
End: 2022-02-17
Payer: COMMERCIAL

## 2022-02-17 VITALS
RESPIRATION RATE: 20 BRPM | BODY MASS INDEX: 32.65 KG/M2 | DIASTOLIC BLOOD PRESSURE: 80 MMHG | HEIGHT: 67 IN | WEIGHT: 208 LBS | SYSTOLIC BLOOD PRESSURE: 138 MMHG | TEMPERATURE: 98 F

## 2022-02-17 DIAGNOSIS — Z12.5 SPECIAL SCREENING, PROSTATE CANCER: ICD-10-CM

## 2022-02-17 DIAGNOSIS — R68.81 EARLY SATIETY: ICD-10-CM

## 2022-02-17 DIAGNOSIS — I10 ESSENTIAL HYPERTENSION: ICD-10-CM

## 2022-02-17 DIAGNOSIS — K62.89 PROCTALGIA: Primary | ICD-10-CM

## 2022-02-17 DIAGNOSIS — E11.8 TYPE 2 DIABETES MELLITUS WITH COMPLICATION, WITHOUT LONG-TERM CURRENT USE OF INSULIN (HCC): ICD-10-CM

## 2022-02-17 PROBLEM — R77.8 ELEVATED TROPONIN: Status: RESOLVED | Noted: 2018-04-16 | Resolved: 2022-02-17

## 2022-02-17 PROBLEM — R79.89 ELEVATED TROPONIN: Status: RESOLVED | Noted: 2018-04-16 | Resolved: 2022-02-17

## 2022-02-17 PROCEDURE — 3008F BODY MASS INDEX DOCD: CPT | Performed by: FAMILY MEDICINE

## 2022-02-17 PROCEDURE — 99214 OFFICE O/P EST MOD 30 MIN: CPT | Performed by: FAMILY MEDICINE

## 2022-02-17 PROCEDURE — 3075F SYST BP GE 130 - 139MM HG: CPT | Performed by: FAMILY MEDICINE

## 2022-02-17 PROCEDURE — 3079F DIAST BP 80-89 MM HG: CPT | Performed by: FAMILY MEDICINE

## 2022-02-18 ENCOUNTER — LAB ENCOUNTER (OUTPATIENT)
Dept: LAB | Age: 62
End: 2022-02-18
Attending: FAMILY MEDICINE
Payer: COMMERCIAL

## 2022-02-18 ENCOUNTER — TELEPHONE (OUTPATIENT)
Dept: INTERNAL MEDICINE CLINIC | Facility: CLINIC | Age: 62
End: 2022-02-18

## 2022-02-18 DIAGNOSIS — Z12.5 SPECIAL SCREENING, PROSTATE CANCER: ICD-10-CM

## 2022-02-18 DIAGNOSIS — I10 ESSENTIAL HYPERTENSION: ICD-10-CM

## 2022-02-18 DIAGNOSIS — E11.8 TYPE 2 DIABETES MELLITUS WITH COMPLICATION, WITHOUT LONG-TERM CURRENT USE OF INSULIN (HCC): ICD-10-CM

## 2022-02-18 LAB
ALBUMIN SERPL-MCNC: 3.9 G/DL (ref 3.4–5)
ALBUMIN/GLOB SERPL: 1.2 {RATIO} (ref 1–2)
ALP LIVER SERPL-CCNC: 79 U/L
ALT SERPL-CCNC: 40 U/L
ANION GAP SERPL CALC-SCNC: 4 MMOL/L (ref 0–18)
AST SERPL-CCNC: 20 U/L (ref 15–37)
BASOPHILS # BLD AUTO: 0.06 X10(3) UL (ref 0–0.2)
BASOPHILS NFR BLD AUTO: 0.7 %
BILIRUB SERPL-MCNC: 0.7 MG/DL (ref 0.1–2)
BUN BLD-MCNC: 13 MG/DL (ref 7–18)
CALCIUM BLD-MCNC: 9.1 MG/DL (ref 8.5–10.1)
CHLORIDE SERPL-SCNC: 106 MMOL/L (ref 98–112)
CHOLEST SERPL-MCNC: 207 MG/DL (ref ?–200)
CO2 SERPL-SCNC: 29 MMOL/L (ref 21–32)
COMPLEXED PSA SERPL-MCNC: 1.21 NG/ML (ref ?–4)
CREAT BLD-MCNC: 1.01 MG/DL
CREAT UR-SCNC: 236 MG/DL
EOSINOPHIL # BLD AUTO: 0.23 X10(3) UL (ref 0–0.7)
EOSINOPHIL NFR BLD AUTO: 2.6 %
ERYTHROCYTE [DISTWIDTH] IN BLOOD BY AUTOMATED COUNT: 13.4 %
EST. AVERAGE GLUCOSE BLD GHB EST-MCNC: 186 MG/DL (ref 68–126)
FASTING PATIENT LIPID ANSWER: YES
FASTING STATUS PATIENT QL REPORTED: YES
GLOBULIN PLAS-MCNC: 3.2 G/DL (ref 2.8–4.4)
GLUCOSE BLD-MCNC: 166 MG/DL (ref 70–99)
HBA1C MFR BLD: 8.1 % (ref ?–5.7)
HCT VFR BLD AUTO: 44.1 %
HDLC SERPL-MCNC: 29 MG/DL (ref 40–59)
HGB BLD-MCNC: 14 G/DL
IMM GRANULOCYTES # BLD AUTO: 0.07 X10(3) UL (ref 0–1)
IMM GRANULOCYTES NFR BLD: 0.8 %
LDLC SERPL CALC-MCNC: 104 MG/DL (ref ?–100)
LYMPHOCYTES # BLD AUTO: 2.53 X10(3) UL (ref 1–4)
LYMPHOCYTES NFR BLD AUTO: 28.7 %
MCH RBC QN AUTO: 27.2 PG (ref 26–34)
MCHC RBC AUTO-ENTMCNC: 31.7 G/DL (ref 31–37)
MCV RBC AUTO: 85.6 FL
MICROALBUMIN UR-MCNC: 6.43 MG/DL
MICROALBUMIN/CREAT 24H UR-RTO: 27.2 UG/MG (ref ?–30)
MONOCYTES # BLD AUTO: 0.44 X10(3) UL (ref 0.1–1)
MONOCYTES NFR BLD AUTO: 5 %
NEUTROPHILS # BLD AUTO: 5.49 X10 (3) UL (ref 1.5–7.7)
NEUTROPHILS # BLD AUTO: 5.49 X10(3) UL (ref 1.5–7.7)
NEUTROPHILS NFR BLD AUTO: 62.2 %
NONHDLC SERPL-MCNC: 178 MG/DL (ref ?–130)
OSMOLALITY SERPL CALC.SUM OF ELEC: 292 MOSM/KG (ref 275–295)
PLATELET # BLD AUTO: 384 10(3)UL (ref 150–450)
POTASSIUM SERPL-SCNC: 4.6 MMOL/L (ref 3.5–5.1)
PROT SERPL-MCNC: 7.1 G/DL (ref 6.4–8.2)
RBC # BLD AUTO: 5.15 X10(6)UL
SODIUM SERPL-SCNC: 139 MMOL/L (ref 136–145)
TRIGL SERPL-MCNC: 435 MG/DL (ref 30–149)
VLDLC SERPL CALC-MCNC: 75 MG/DL (ref 0–30)
WBC # BLD AUTO: 8.8 X10(3) UL (ref 4–11)

## 2022-02-18 PROCEDURE — 82570 ASSAY OF URINE CREATININE: CPT

## 2022-02-18 PROCEDURE — 36415 COLL VENOUS BLD VENIPUNCTURE: CPT

## 2022-02-18 PROCEDURE — 85025 COMPLETE CBC W/AUTO DIFF WBC: CPT

## 2022-02-18 PROCEDURE — 83036 HEMOGLOBIN GLYCOSYLATED A1C: CPT

## 2022-02-18 PROCEDURE — 82043 UR ALBUMIN QUANTITATIVE: CPT

## 2022-02-18 PROCEDURE — 80053 COMPREHEN METABOLIC PANEL: CPT

## 2022-02-18 PROCEDURE — 80061 LIPID PANEL: CPT

## 2022-02-18 NOTE — TELEPHONE ENCOUNTER
Pt requesting refill:  Pt states  and him discussed ab changing it from 500mg to 1000mg    metFORMIN     [facility] [address] [place] [address] 28 AT [address], [phone], [phone]

## 2022-02-21 RX ORDER — ROSUVASTATIN CALCIUM 10 MG/1
10 TABLET, COATED ORAL NIGHTLY
Qty: 90 TABLET | Refills: 0 | Status: SHIPPED | OUTPATIENT
Start: 2022-02-21

## 2022-02-28 ENCOUNTER — TELEPHONE (OUTPATIENT)
Dept: INTERNAL MEDICINE CLINIC | Facility: CLINIC | Age: 62
End: 2022-02-28

## 2022-02-28 RX ORDER — BLOOD-GLUCOSE METER
1 KIT MISCELLANEOUS AS NEEDED
Qty: 1 EACH | Refills: 0 | Status: CANCELLED | OUTPATIENT
Start: 2022-02-28

## 2022-02-28 RX ORDER — BLOOD SUGAR DIAGNOSTIC
STRIP MISCELLANEOUS
Qty: 100 STRIP | Refills: 0 | Status: CANCELLED | OUTPATIENT
Start: 2022-02-28

## 2022-02-28 NOTE — TELEPHONE ENCOUNTER
Pt spouse called requesting an all new rx for free style machine, test strips and lancets.     Please advise    [facility] [address] [facility] [address] [place] [address], [phone], [phone]

## 2022-03-16 RX ORDER — LANCETS 28 GAUGE
EACH MISCELLANEOUS
Qty: 100 EACH | Refills: 0 | Status: SHIPPED | OUTPATIENT
Start: 2022-03-16

## 2022-03-16 RX ORDER — BLOOD-GLUCOSE METER
1 KIT MISCELLANEOUS 2 TIMES DAILY
Qty: 1 KIT | Refills: 0 | Status: SHIPPED | OUTPATIENT
Start: 2022-03-16 | End: 2023-03-16

## 2022-03-16 RX ORDER — LANCETS 28 GAUGE
1 EACH MISCELLANEOUS AS NEEDED
Qty: 100 EACH | Refills: 0 | Status: SHIPPED | OUTPATIENT
Start: 2022-03-16 | End: 2023-03-16

## 2022-03-16 NOTE — TELEPHONE ENCOUNTER
Robin Quinonez with [facility] stated pt came through their drive thru and thought he was supposed to  diabetes supplies. Robin Quinonez stated that they did not receive anything at the pharmacy. Robin Quinonez wanted to confirm if pt was supposed to get anything.      [phone] best call back for [address] [facility], [address] [place] [address] [facility] [address] [place] [address] , [phone], [facility] [facility]

## 2022-03-21 ENCOUNTER — TELEPHONE (OUTPATIENT)
Dept: INTERNAL MEDICINE CLINIC | Facility: CLINIC | Age: 62
End: 2022-03-21

## 2022-03-21 RX ORDER — BLOOD SUGAR DIAGNOSTIC
STRIP MISCELLANEOUS
Qty: 100 STRIP | Refills: 1 | Status: SHIPPED | OUTPATIENT
Start: 2022-03-21 | End: 2023-03-21

## 2022-03-21 NOTE — TELEPHONE ENCOUNTER
Susan Stoddard from [employer] called stating they received the Freestyle lancets and machine but they need the testing strips. They are requesting Freestyle testing strips.  please advise

## 2022-03-23 ENCOUNTER — HOSPITAL ENCOUNTER (OUTPATIENT)
Facility: HOSPITAL | Age: 62
Setting detail: HOSPITAL OUTPATIENT SURGERY
Discharge: HOME | End: 2022-03-23
Attending: INTERNAL MEDICINE | Admitting: INTERNAL MEDICINE
Payer: COMMERCIAL

## 2022-03-23 ENCOUNTER — ANESTHESIA EVENT (OUTPATIENT)
Dept: ENDOSCOPY | Facility: HOSPITAL | Age: 62
End: 2022-03-23
Payer: COMMERCIAL

## 2022-03-23 ENCOUNTER — ANESTHESIA (OUTPATIENT)
Dept: ENDOSCOPY | Facility: HOSPITAL | Age: 62
End: 2022-03-23
Payer: COMMERCIAL

## 2022-03-23 VITALS
DIASTOLIC BLOOD PRESSURE: 81 MMHG | BODY MASS INDEX: 31.86 KG/M2 | WEIGHT: 203 LBS | OXYGEN SATURATION: 94 % | SYSTOLIC BLOOD PRESSURE: 126 MMHG | HEIGHT: 67 IN | TEMPERATURE: 98 F | HEART RATE: 74 BPM | RESPIRATION RATE: 18 BRPM

## 2022-03-23 DIAGNOSIS — K62.5 RECTAL BLEEDING: ICD-10-CM

## 2022-03-23 DIAGNOSIS — Z12.11 COLON CANCER SCREENING: ICD-10-CM

## 2022-03-23 LAB — GLUCOSE BLD-MCNC: 135 MG/DL (ref 70–99)

## 2022-03-23 PROCEDURE — 0DJD8ZZ INSPECTION OF LOWER INTESTINAL TRACT, VIA NATURAL OR ARTIFICIAL OPENING ENDOSCOPIC: ICD-10-PCS | Performed by: INTERNAL MEDICINE

## 2022-03-23 PROCEDURE — 82962 GLUCOSE BLOOD TEST: CPT

## 2022-03-23 RX ORDER — SODIUM CHLORIDE, SODIUM LACTATE, POTASSIUM CHLORIDE, CALCIUM CHLORIDE 600; 310; 30; 20 MG/100ML; MG/100ML; MG/100ML; MG/100ML
INJECTION, SOLUTION INTRAVENOUS CONTINUOUS
Status: DISCONTINUED | OUTPATIENT
Start: 2022-03-23 | End: 2022-03-23

## 2022-03-23 RX ORDER — NICOTINE POLACRILEX 4 MG
15 LOZENGE BUCCAL
Status: DISCONTINUED | OUTPATIENT
Start: 2022-03-23 | End: 2022-03-23

## 2022-03-23 RX ORDER — NICOTINE POLACRILEX 4 MG
30 LOZENGE BUCCAL
Status: DISCONTINUED | OUTPATIENT
Start: 2022-03-23 | End: 2022-03-23

## 2022-03-23 RX ORDER — DEXTROSE MONOHYDRATE 25 G/50ML
50 INJECTION, SOLUTION INTRAVENOUS
Status: DISCONTINUED | OUTPATIENT
Start: 2022-03-23 | End: 2022-03-23

## 2022-03-23 RX ORDER — LIDOCAINE HYDROCHLORIDE 10 MG/ML
INJECTION, SOLUTION EPIDURAL; INFILTRATION; INTRACAUDAL; PERINEURAL AS NEEDED
Status: DISCONTINUED | OUTPATIENT
Start: 2022-03-23 | End: 2022-03-23 | Stop reason: SURG

## 2022-03-23 RX ORDER — NALOXONE HYDROCHLORIDE 0.4 MG/ML
80 INJECTION, SOLUTION INTRAMUSCULAR; INTRAVENOUS; SUBCUTANEOUS AS NEEDED
Status: DISCONTINUED | OUTPATIENT
Start: 2022-03-23 | End: 2022-03-23

## 2022-03-23 RX ADMIN — LIDOCAINE HYDROCHLORIDE 25 MG: 10 INJECTION, SOLUTION EPIDURAL; INFILTRATION; INTRACAUDAL; PERINEURAL at 07:54:00

## 2022-03-23 RX ADMIN — SODIUM CHLORIDE, SODIUM LACTATE, POTASSIUM CHLORIDE, CALCIUM CHLORIDE: 600; 310; 30; 20 INJECTION, SOLUTION INTRAVENOUS at 07:53:00

## 2022-03-23 NOTE — ANESTHESIA POSTPROCEDURE EVALUATION
[facility] Patient Status:  Hospital Outpatient Surgery   Age/Gender 62year old male MRN [id number]   Location [facility] 28 Attending Jacob Jaimes, [facility] Day # 0 PCP Brigitte Zhu MD       Anesthesia Post-op Note    COLONOSCOPY    Procedure Summary     Date: 03/23/22 Room / Location: [facility] ENDOSCOPY 03 / [facility] ENDOSCOPY    Anesthesia Start: 4121 Anesthesia Stop: 4396    Procedure: COLONOSCOPY (N/A ) Diagnosis:       Rectal bleeding      Colon cancer screening      (hemorrhoids)    Surgeons: Jacob Jaimes DO Anesthesiologist: Stephanie Gore MD    Anesthesia Type: MAC ASA Status: Not recorded          Anesthesia Type: MAC    Vitals Value Taken Time   /81 03/23/22 0825   Temp 98.0 03/23/22 0825   Pulse 82 03/23/22 0825   Resp 16 03/23/22 0825   SpO2 98% 03/23/22 0825       Patient Location: Endoscopy    Anesthesia Type: MAC    Airway Patency: patent    Postop Pain Control: adequate    Mental Status: mildly sedated but able to meaningfully participate in the post-anesthesia evaluation    Nausea/Vomiting: none    Cardiopulmonary/Hydration status: stable euvolemic    Complications: no apparent anesthesia related complications    Postop vital signs: stable    Dental Exam: Unchanged from Preop    Patient to be discharged home when criteria met.

## 2022-03-31 NOTE — TELEPHONE ENCOUNTER
Pt spouse called and stated a PA needs to be completed because it is too expensive. Spouse requesting this to be done as soon as possible.      Please advise

## 2022-04-02 NOTE — TELEPHONE ENCOUNTER
PA placed in  in basket for review    Patients wife states insurance states that PA is needed for testing strips--patient already has device

## 2022-04-04 NOTE — TELEPHONE ENCOUNTER
Faxed completed PA with confirmation    Placed to scan as well as placed in blue accordion folder in Pod 3

## (undated) DIAGNOSIS — E78.00 ELEVATED CHOLESTEROL: ICD-10-CM

## (undated) DIAGNOSIS — E11.8 TYPE 2 DIABETES MELLITUS WITH COMPLICATION, WITHOUT LONG-TERM CURRENT USE OF INSULIN (HCC): ICD-10-CM

## (undated) DIAGNOSIS — E11.8 TYPE 2 DIABETES MELLITUS WITH COMPLICATION, WITHOUT LONG-TERM CURRENT USE OF INSULIN (HCC): Primary | ICD-10-CM

## (undated) DEVICE — ENDOSCOPY PACK - LOWER: Brand: MEDLINE INDUSTRIES, INC.

## (undated) DEVICE — FORCEP BIOPSY RJ4 LG CAP W/ND

## (undated) DEVICE — MEDI-VAC NON-CONDUCTIVE SUCTION TUBING: Brand: CARDINAL HEALTH

## (undated) DEVICE — Device: Brand: DEFENDO AIR/WATER/SUCTION AND BIOPSY VALVE

## (undated) DEVICE — ENDOSCOPY PACK UPPER: Brand: MEDLINE INDUSTRIES, INC.

## (undated) DEVICE — 3M™ RED DOT™ MONITORING ELECTRODE WITH FOAM TAPE AND STICKY GEL, 50/BAG, 20/CASE, 72/PLT 2570: Brand: RED DOT™

## (undated) DEVICE — FILTERLINE NASAL ADULT O2/CO2

## (undated) DEVICE — FLUIDGARD® 160 ANTI-FOG SURGICAL MASK WITH ANTI-GLARE SHIELD: Brand: PRECEPT ®

## (undated) DEVICE — 1200CC GUARDIAN II: Brand: GUARDIAN

## (undated) DEVICE — MEDI-VAC SUCTION HANDLE REGULAR CAPACITY: Brand: CARDINAL HEALTH

## (undated) NOTE — IP AVS SNAPSHOT
Patient Demographics     Address  [address] [address] Phone  [phone] [facility])  [phone] [facility] E-mail Address  Mei Calabrese. [email or web address]      Emergency Contact(s)     Name Relation Home Work Mobile    Chio Campbell Specialty:  CARDIOLOGY  Contact information:  [address] [facility]  [phone]             pcp  In 2 weeks.                 Your medication list      TAKE these medications       Instructions Autho Please  your prescriptions at the location directed by your doctor or nurse    Bring a paper prescription for each of these medications  ACCU-CHEK FASTCLIX LANCETS [facility]  ACCU-CHEK GUIDE [facility]           [id number] - MAR ACTION REPORT  (last 24 hrs) Patient Weight  93.8 kg (206 lb 11.2 oz)         Lab Results Last 24 Hours      PTT, ACTIVATED [[id number]] (Abnormal)  Resulted: 04/19/18 0631, Result status: Final result   Ordering provider:  Barbara Robin MD  04/18/18 2300 Resulting lab:  [facility] Ordering provider:  ALMA Eddy  04/18/18 2308 Resulting lab:  [facility]    Specimen Information    Type Source Collected On   Blood — 04/19/18 8389          Components    Component Value Reference Range Flag Lab   WBC 8.2 4.0 - 13.0 x10(3) uL — Microbiology Results (All)     None         H&P - H&P Note      H&P signed by Leah Lovett MD at 4/16/2018  2:38 AM  Version 2 of 2    Author:  Leah Lovett MD Service:  Hospitalist Author Type:  Physician    Filed:  4/16/2018  2:38 AM Date of Serv 10/10: OTHER SURGICAL HISTORY      Comment: shoulder surgery  R   No date: OTHER SURGICAL HISTORY      Comment: nasal septoplasty       Social History:  reports that he has never smoked.  He has never used smokeless tobacco. He reports that he does not drin Recent Labs   Lab  04/16/18   0005   GLU  319*   BUN  21*   CREATSERUM  1.15   GFRAA  82   GFRNAA  71   CA  8.2*   ALB  3.5   NA  132*   CL  103   CO2  25.0   ALKPHO  97   ALT  39   BILT  0.7   TP  6.8       Estimated Creatinine Clearance: 67.1 mL/min CAD, DM, HTN who presents with epigastric pain associated with diaphoresis and pain radiating down both arms happening intermittently for the past two weeks. This is similar to patient's symptoms when he was found to have CAD and received stents.  He stoppe A comprehensive 14 point review of systems was completed. Pertinent positives and negatives noted in the HPI.     Physical Exam:    /92 (BP Location: Left arm)   Pulse 75   Temp (!) 97.5 °F (36.4 °C) (Oral)   Resp 19   Ht 5' 7\" (1.702 m)   Wt 206 · DVT Prophylaxis: lovenox   · CODE status: full  · Grajeda: none    Plan of care discussed with patient, wife, Valentín Orantes MD  4/16/2018[MM. 1]                        Electronically signed by Kathy Mejia MD on 4/16/2018  2:34 AM   Attribution medicines for blood pressure, diabetes, lipids, and antiplatelet agents. All of these have been stopped. Separately, over the last month the patient has had a 12-pound weight loss and has had some dysphagia. The etiology of this is not clear.   There h disorder. ENVIRONMENTAL ALLERGIES: None. PHYSICAL EXAMINATION:    GENERAL:  A pleasant male resting comfortably in no acute distress. There was a fair amount of family pressure to encourage him to stay.   VITAL SIGNS:  His blood pressure is 136/82, pul records from 2017. The patient is looking to establish a cardiology primary care here in [place]. 5.   Discussed the patient with Dr. Todd Quintana and with the patient's family at bedside. Thank you for allowing us to see the patient.   We will follow with food sticking. Pt's spouse disagreed and stated that he has had complaints of food sticking \"a lot more than once. \" Pt denied any difficulties with noon meal containing regular diet/thin liquids and spouse confirmed this to be true.  Pt was alert & parti Diet Prior to Admission: Regular; Thin liquids  Precautions: Reflux    Patient/Family Goals: To eat and drink safely     SWALLOWING HISTORY  Current Diet Consistency: Regular; Thin liquids  Dysphagia History: pt with complaints that appear GI related  Imagin

## (undated) NOTE — LETTER
Date: 2018  Patient Name:  Krunal Bolden             Address: [address] [place] [address]  [place] [address]    : 1960    Dear Krunal Bolden,    The biopsies obtained at the time of your recent upper endoscopic procedure were benign and s

## (undated) NOTE — LETTER
[facility]  Merary Mc 62 [address]    Consent for Operation    Date: __________________    Time: _______________    1.  I authorize the performance upon Maryann Armstrong the following operation:    Procedure(s):  ESOPHAGOGASTRODUOD procedure has been videotaped, the surgeon will obtain the original videotape. The hospital will not be responsible for storage or maintenance of this tape.     6. For the purpose of advancing medical education, I consent to the admittance of observers to t STATEMENTS REQUIRING INSERTION OR COMPLETION WERE FILLED IN.     Signature of Patient:   ___________________________    When the patient is a minor or mentally incompetent to give consent:  Signature of person authorized to consent for patient: ____________ supplements, and pills I can buy without a prescription (including street drugs/illegal medications). Failure to inform my anesthesiologist about these medicines may increase my risk of anesthetic complications.   · If I am allergic to anything or have had Anesthesiologist Signature     Date   Time  I have discussed the procedure and information above with the patient (or patient’s representative) and answered their questions. The patient or their representative has agreed to have anesthesia services.     ___

## (undated) NOTE — LETTER
[facility] [address], [address]    Consent for Operation    Date: 4-    Time: 1700    1.  I authorize the performance upon Hero Mendez the following operation:      ESOPHAGOGASTRODUODENOSCOPY (EGD) with monitored ane procedure has been videotaped, the surgeon will obtain the original videotape. The hospital will not be responsible for storage or maintenance of this tape.     6. For the purpose of advancing medical education, I consent to the admittance of observers to t STATEMENTS REQUIRING INSERTION OR COMPLETION WERE FILLED IN.     Signature of Patient:   ___________________________    When the patient is a minor or mentally incompetent to give consent:  Signature of person authorized to consent for patient: ____________ supplements, and pills I can buy without a prescription (including street drugs/illegal medications). Failure to inform my anesthesiologist about these medicines may increase my risk of anesthetic complications.   · If I am allergic to anything or have had Anesthesiologist Signature     Date   Time  I have discussed the procedure and information above with the patient (or patient’s representative) and answered their questions. The patient or their representative has agreed to have anesthesia services.     ___

## (undated) NOTE — IP AVS SNAPSHOT
[address]            (For Outpatient Use Only) Initial Admit Date: 4/15/2018   Inpt/Obs Admit Date: Inpt: 4/16/18 / Obs: N/A   Discharge Date:    Clair Kim:  [de-identified]   MRN: [de-identified]   CSN: [id number]        ENCOUNTER  Patient Hospital Account Financial Class: [facility]    April 19, 2018

## (undated) NOTE — LETTER
04/16/21        Abdullahi Beatty [facility] 108 [address]      Dear Artist Read,    [facility] records indicate that you have outstanding lab work and or testing that was ordered for you and has not yet been completed:  Fasting Blood Work   To s

## (undated) NOTE — LETTER
[facility] [address], [address]  Consent for Procedure/Sedation    Date:    Time:       1.  I authorize the performance upon Howie Campbell the following:cardiac catheterization, left ventricular cineangiography, bilateral shalini period, the physician will determine when the applicable recovery period ends for purposes of reinstating the Do Not Resuscitate (DNR) order.     Signature of Patient: ____________________________________________________    Signature of person authorized